# Patient Record
Sex: MALE | Race: WHITE | NOT HISPANIC OR LATINO | Employment: FULL TIME | URBAN - METROPOLITAN AREA
[De-identification: names, ages, dates, MRNs, and addresses within clinical notes are randomized per-mention and may not be internally consistent; named-entity substitution may affect disease eponyms.]

---

## 2019-02-08 ENCOUNTER — NON-PROVIDER VISIT (OUTPATIENT)
Dept: OCCUPATIONAL MEDICINE | Facility: CLINIC | Age: 24
End: 2019-02-08

## 2019-02-08 DIAGNOSIS — Z02.1 PRE-EMPLOYMENT DRUG SCREENING: ICD-10-CM

## 2019-02-08 LAB
AMP AMPHETAMINE: NORMAL
COC COCAINE: NORMAL
INT CON NEG: NORMAL
INT CON POS: NORMAL
MET METHAMPHETAMINES: NORMAL
OPI OPIATES: NORMAL
PCP PHENCYCLIDINE: NORMAL
POC DRUG COMMENT 753798-OCCUPATIONAL HEALTH: NORMAL
THC: NORMAL

## 2019-02-08 PROCEDURE — 80305 DRUG TEST PRSMV DIR OPT OBS: CPT | Performed by: PREVENTIVE MEDICINE

## 2019-05-28 ENCOUNTER — HOSPITAL ENCOUNTER (INPATIENT)
Dept: HOSPITAL 8 - ED | Age: 24
LOS: 7 days | Discharge: HOME | DRG: 881 | End: 2019-06-04
Attending: HOSPITALIST | Admitting: HOSPITALIST
Payer: MEDICAID

## 2019-05-28 VITALS — WEIGHT: 132.28 LBS | HEIGHT: 69 IN | BODY MASS INDEX: 19.59 KG/M2

## 2019-05-28 DIAGNOSIS — Z81.1: ICD-10-CM

## 2019-05-28 DIAGNOSIS — Z63.8: ICD-10-CM

## 2019-05-28 DIAGNOSIS — R45.851: ICD-10-CM

## 2019-05-28 DIAGNOSIS — Z83.3: ICD-10-CM

## 2019-05-28 DIAGNOSIS — F32.9: Primary | ICD-10-CM

## 2019-05-28 DIAGNOSIS — Z79.899: ICD-10-CM

## 2019-05-28 DIAGNOSIS — F10.10: ICD-10-CM

## 2019-05-28 DIAGNOSIS — I89.0: ICD-10-CM

## 2019-05-28 DIAGNOSIS — F60.3: ICD-10-CM

## 2019-05-28 LAB
ALBUMIN SERPL-MCNC: 4.3 G/DL (ref 3.4–5)
ALP SERPL-CCNC: 79 U/L (ref 45–117)
ALT SERPL-CCNC: 41 U/L (ref 12–78)
ANION GAP SERPL CALC-SCNC: 7 MMOL/L (ref 5–15)
APAP SERPL-MCNC: < 2 MCG/ML (ref 10–30)
BASOPHILS # BLD AUTO: 0.02 X10^3/UL (ref 0–0.1)
BASOPHILS NFR BLD AUTO: 0 % (ref 0–1)
BILIRUB SERPL-MCNC: 0.2 MG/DL (ref 0.2–1)
CALCIUM SERPL-MCNC: 9 MG/DL (ref 8.5–10.1)
CHLORIDE SERPL-SCNC: 108 MMOL/L (ref 98–107)
CREAT SERPL-MCNC: 1.06 MG/DL (ref 0.7–1.3)
EOSINOPHIL # BLD AUTO: 0.04 X10^3/UL (ref 0–0.4)
EOSINOPHIL NFR BLD AUTO: 1 % (ref 1–7)
ERYTHROCYTE [DISTWIDTH] IN BLOOD BY AUTOMATED COUNT: 12.8 % (ref 9.4–14.8)
LYMPHOCYTES # BLD AUTO: 2.18 X10^3/UL (ref 1–3.4)
LYMPHOCYTES NFR BLD AUTO: 33 % (ref 22–44)
MCH RBC QN AUTO: 30.9 PG (ref 27.5–34.5)
MCHC RBC AUTO-ENTMCNC: 34.1 G/DL (ref 33.2–36.2)
MCV RBC AUTO: 90.8 FL (ref 81–97)
MD: NO
MONOCYTES # BLD AUTO: 0.36 X10^3/UL (ref 0.2–0.8)
MONOCYTES NFR BLD AUTO: 6 % (ref 2–9)
NEUTROPHILS # BLD AUTO: 3.93 X10^3/UL (ref 1.8–6.8)
NEUTROPHILS NFR BLD AUTO: 60 % (ref 42–75)
PLATELET # BLD AUTO: 276 X10^3/UL (ref 130–400)
PMV BLD AUTO: 8 FL (ref 7.4–10.4)
PROT SERPL-MCNC: 8.2 G/DL (ref 6.4–8.2)
RBC # BLD AUTO: 5.29 X10^6/UL (ref 4.38–5.82)
VANCOMYCIN TROUGH SERPL-MCNC: < 1.7 MG/DL (ref 2.8–20)

## 2019-05-28 PROCEDURE — 36415 COLL VENOUS BLD VENIPUNCTURE: CPT

## 2019-05-28 PROCEDURE — 80053 COMPREHEN METABOLIC PANEL: CPT

## 2019-05-28 PROCEDURE — 99285 EMERGENCY DEPT VISIT HI MDM: CPT

## 2019-05-28 PROCEDURE — 85025 COMPLETE CBC W/AUTO DIFF WBC: CPT

## 2019-05-28 PROCEDURE — 80307 DRUG TEST PRSMV CHEM ANLYZR: CPT

## 2019-05-28 SDOH — SOCIAL STABILITY - SOCIAL INSECURITY: OTHER SPECIFIED PROBLEMS RELATED TO PRIMARY SUPPORT GROUP: Z63.8

## 2019-05-29 VITALS — DIASTOLIC BLOOD PRESSURE: 80 MMHG | SYSTOLIC BLOOD PRESSURE: 138 MMHG

## 2019-05-29 VITALS — DIASTOLIC BLOOD PRESSURE: 74 MMHG | SYSTOLIC BLOOD PRESSURE: 131 MMHG

## 2019-05-29 RX ADMIN — MIRTAZAPINE SCH MG: 15 TABLET, FILM COATED ORAL at 20:29

## 2019-05-30 VITALS — SYSTOLIC BLOOD PRESSURE: 115 MMHG | DIASTOLIC BLOOD PRESSURE: 78 MMHG

## 2019-05-30 VITALS — SYSTOLIC BLOOD PRESSURE: 113 MMHG | DIASTOLIC BLOOD PRESSURE: 72 MMHG

## 2019-05-30 RX ADMIN — MIRTAZAPINE SCH MG: 15 TABLET, FILM COATED ORAL at 20:10

## 2019-05-31 VITALS — DIASTOLIC BLOOD PRESSURE: 70 MMHG | SYSTOLIC BLOOD PRESSURE: 109 MMHG

## 2019-05-31 VITALS — SYSTOLIC BLOOD PRESSURE: 128 MMHG | DIASTOLIC BLOOD PRESSURE: 77 MMHG

## 2019-05-31 RX ADMIN — MIRTAZAPINE SCH MG: 15 TABLET, FILM COATED ORAL at 20:13

## 2019-06-01 VITALS — DIASTOLIC BLOOD PRESSURE: 69 MMHG | SYSTOLIC BLOOD PRESSURE: 110 MMHG

## 2019-06-01 VITALS — SYSTOLIC BLOOD PRESSURE: 115 MMHG | DIASTOLIC BLOOD PRESSURE: 76 MMHG

## 2019-06-01 RX ADMIN — MIRTAZAPINE SCH MG: 15 TABLET, FILM COATED ORAL at 20:19

## 2019-06-02 VITALS — DIASTOLIC BLOOD PRESSURE: 81 MMHG | SYSTOLIC BLOOD PRESSURE: 119 MMHG

## 2019-06-02 VITALS — DIASTOLIC BLOOD PRESSURE: 79 MMHG | SYSTOLIC BLOOD PRESSURE: 124 MMHG

## 2019-06-02 RX ADMIN — MIRTAZAPINE SCH MG: 15 TABLET, FILM COATED ORAL at 20:38

## 2019-06-03 VITALS — DIASTOLIC BLOOD PRESSURE: 74 MMHG | SYSTOLIC BLOOD PRESSURE: 112 MMHG

## 2019-06-03 VITALS — SYSTOLIC BLOOD PRESSURE: 130 MMHG | DIASTOLIC BLOOD PRESSURE: 80 MMHG

## 2019-06-03 RX ADMIN — MIRTAZAPINE SCH MG: 15 TABLET, FILM COATED ORAL at 20:27

## 2019-06-04 VITALS — SYSTOLIC BLOOD PRESSURE: 107 MMHG | DIASTOLIC BLOOD PRESSURE: 70 MMHG

## 2022-05-02 ENCOUNTER — TELEPHONE (OUTPATIENT)
Dept: SCHEDULING | Facility: IMAGING CENTER | Age: 27
End: 2022-05-02

## 2022-06-10 ENCOUNTER — APPOINTMENT (OUTPATIENT)
Dept: MEDICAL GROUP | Facility: MEDICAL CENTER | Age: 27
End: 2022-06-10
Payer: COMMERCIAL

## 2022-09-13 ENCOUNTER — OFFICE VISIT (OUTPATIENT)
Dept: MEDICAL GROUP | Facility: LAB | Age: 27
End: 2022-09-13
Payer: COMMERCIAL

## 2022-09-13 VITALS
OXYGEN SATURATION: 94 % | TEMPERATURE: 98.3 F | HEART RATE: 74 BPM | BODY MASS INDEX: 21.39 KG/M2 | SYSTOLIC BLOOD PRESSURE: 126 MMHG | DIASTOLIC BLOOD PRESSURE: 64 MMHG | WEIGHT: 144.4 LBS | RESPIRATION RATE: 14 BRPM | HEIGHT: 69 IN

## 2022-09-13 DIAGNOSIS — R45.84 ANHEDONIA: ICD-10-CM

## 2022-09-13 DIAGNOSIS — M25.551 CHRONIC PAIN OF RIGHT HIP: ICD-10-CM

## 2022-09-13 DIAGNOSIS — G89.29 CHRONIC PAIN OF RIGHT HIP: ICD-10-CM

## 2022-09-13 DIAGNOSIS — Q82.4 ECTODERMAL DYSPLASIA: ICD-10-CM

## 2022-09-13 DIAGNOSIS — H61.23 BILATERAL IMPACTED CERUMEN: ICD-10-CM

## 2022-09-13 PROBLEM — M25.552 CHRONIC LEFT HIP PAIN: Status: ACTIVE | Noted: 2022-09-13

## 2022-09-13 PROCEDURE — 99204 OFFICE O/P NEW MOD 45 MIN: CPT | Performed by: NURSE PRACTITIONER

## 2022-09-13 RX ORDER — MULTIVITAMIN
1 TABLET ORAL EVERY MORNING
COMMUNITY

## 2022-09-13 NOTE — LETTER
September 13, 2022         Patient: Jose Alfredo Merino   YOB: 1995   Date of Visit: 9/13/2022           To Whom it May Concern:    Jose Alfredo Merino was seen in my clinic on 9/13/2022. 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        Sincerely,           NAYELI Talamantes.

## 2022-09-13 NOTE — ASSESSMENT & PLAN NOTE
Congenital condition.   Patient reports that he has chronic knee pain due to lack of synovial fluid.   No sweat glands.   He feels like driving has been increasing his symptoms.   He has to drive about an hour to work everyday. He would like ADA accommodations so that he can work from home.

## 2022-09-13 NOTE — ASSESSMENT & PLAN NOTE
Onset about 9 months ago, he was doing back squats pretty regularly. Then he started having to commute for work and sits at a desk all day. He reports that his hip pain gets worse when he has to drive to work. He has been going to the chiropractor with some relief.   Not currently taking medication.  He would like ADA paperwork so that he can work from home.   Denies back pain, numbness, tingling.   Pinching pain and aching afterward.

## 2022-09-19 ENCOUNTER — TELEPHONE (OUTPATIENT)
Dept: MEDICAL GROUP | Facility: LAB | Age: 27
End: 2022-09-19
Payer: COMMERCIAL

## 2022-09-19 NOTE — TELEPHONE ENCOUNTER
Jon called and asked for our fax number to send over a DREW from his work.  I got the fax and sent over the office notes from the only visit he has had with our office to his work. DREW is scanned into chart for future records

## 2022-09-21 ENCOUNTER — TELEPHONE (OUTPATIENT)
Dept: MEDICAL GROUP | Facility: LAB | Age: 27
End: 2022-09-21
Payer: COMMERCIAL

## 2022-09-21 PROBLEM — R45.84 ANHEDONIA: Status: ACTIVE | Noted: 2022-09-21

## 2022-09-21 NOTE — PROGRESS NOTES
"Subjective:     CC:    Chief Complaint   Patient presents with    Establish Care     HISTORY OF THE PRESENT ILLNESS: Patient is a 27 y.o. male. This pleasant patient is here today to establish care and discuss the following:    Chronic pain of right hip  Onset about 9 months ago, he was doing back squats pretty regularly. Then he started having to commute for work and sits at a desk all day. He reports that his hip pain gets worse when he has to drive to work. He has been going to the chiropractor with some relief.   Not currently taking medication.  He would like ADA paperwork so that he can work from home.   Denies back pain, numbness, tingling.   Pinching pain and aching afterward.     Ectodermal dysplasia  Congenital condition.   Patient reports that he has chronic knee pain due to lack of synovial fluid.   No sweat glands.   He feels like driving has been increasing his symptoms.   He has to drive about an hour to work everyday. He would like [place] accommodations so that he can work from home.     Anhedonia  Patient reports that he no longer feels pleasure from orgasm. Unsure of onset. Reports some intermittent depression and anxiety. Eating and drinking well, taking supplements. No current medications.     ROS:   As documented in history of present illness above      Objective:     Exam: /64 (BP Location: Right arm, Patient Position: Sitting, BP Cuff Size: Adult)   Pulse 74   Temp 36.8 °C (98.3 °F)   Resp 14   Ht 1.753 m (5' 9\")   Wt 65.5 kg (144 lb 6.4 oz)   SpO2 94%  Body mass index is 21.32 kg/m².    Constitutional: Alert, no distress, well-groomed.  Skin: Warm, dry, good turgor, no rashes in visible areas.  Eye: Equal, round and reactive, conjunctiva clear, lids normal.  ENMT: Lips without lesions, good dentition, moist mucous membranes.  Neck: Trachea midline, no masses, no thyromegaly.  Respiratory: Unlabored respiratory effort, no cough.  MSK: Normal gait, moves all extremities.  Neuro: " Grossly non-focal.   Psych: Alert and oriented x3, normal affect and mood.    Assessment & Plan:   27 y.o. male with the following -    1. Chronic pain of right hip  Referral placed to physical therapy. Patient can take ibuprofen as directed for pain.  - Referral to Physical Therapy    2. Ectodermal dysplasia  Referral placed to dermatology. Note provided for work for ADA accommodations.   - Referral to Dermatology    3. Anhedonia  Labs ordered. Referral placed to behavioral health. Denies any suicidal or homicidal ideation. Discussed that should the patient have any symptoms they should call suicide prevention hotline or report to the emergency room immediately. Emphasized importance of healthy diet and exercise.    - PROLACTIN; Future  - VITAMIN D,25 HYDROXY (DEFICIENCY); Future  - VITAMIN B12; Future  - CBC WITHOUT DIFFERENTIAL; Future  - Comp Metabolic Panel; Future  - Lipid Profile; Future  - HEMOGLOBIN A1C; Future  - TSH WITH REFLEX TO FT4; Future  - Testosterone, Free & Total, Adult Male (w/SHBG); Future  - Referral to Behavioral Health    4. Bilateral impacted cerumen  Referral placed to ENT.  - Referral to ENT    My total time spent caring for the patient on the day of the encounter was 47 minutes.   This does not include time spent on separately billable procedures/tests.    Please note that this dictation was created using voice recognition software. I have made every reasonable attempt to correct obvious errors, but I expect that there are errors of grammar and possibly content that I did not discover before finalizing the note.

## 2022-09-21 NOTE — ASSESSMENT & PLAN NOTE
Patient reports that he no longer feels pleasure from orgasm. Unsure of onset. Reports some intermittent depression and anxiety. Eating and drinking well, taking supplements. No current medications.

## 2022-09-21 NOTE — TELEPHONE ENCOUNTER
1. Caller Name: Beena Goldberg / Holden Bustamante                       Call Back Number: [phone]      How would the patient prefer to be contacted with a response:     Needs to discuss pt's work accommodations.

## 2025-01-26 ENCOUNTER — HOSPITAL ENCOUNTER (EMERGENCY)
Facility: MEDICAL CENTER | Age: 30
End: 2025-01-26
Attending: EMERGENCY MEDICINE

## 2025-01-26 VITALS
SYSTOLIC BLOOD PRESSURE: 145 MMHG | RESPIRATION RATE: 18 BRPM | WEIGHT: 145.28 LBS | OXYGEN SATURATION: 95 % | HEIGHT: 69 IN | HEART RATE: 94 BPM | TEMPERATURE: 97.5 F | BODY MASS INDEX: 21.52 KG/M2 | DIASTOLIC BLOOD PRESSURE: 62 MMHG

## 2025-01-26 DIAGNOSIS — R44.3 HALLUCINATIONS: ICD-10-CM

## 2025-01-26 PROCEDURE — 99282 EMERGENCY DEPT VISIT SF MDM: CPT
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
